# Patient Record
Sex: FEMALE | Race: OTHER | HISPANIC OR LATINO | URBAN - METROPOLITAN AREA
[De-identification: names, ages, dates, MRNs, and addresses within clinical notes are randomized per-mention and may not be internally consistent; named-entity substitution may affect disease eponyms.]

---

## 2023-08-30 ENCOUNTER — EMERGENCY (EMERGENCY)
Facility: HOSPITAL | Age: 28
LOS: 1 days | Discharge: HOME | End: 2023-08-30
Attending: STUDENT IN AN ORGANIZED HEALTH CARE EDUCATION/TRAINING PROGRAM
Payer: MEDICAID

## 2023-08-30 VITALS
TEMPERATURE: 98 F | WEIGHT: 177.03 LBS | HEART RATE: 103 BPM | DIASTOLIC BLOOD PRESSURE: 81 MMHG | HEIGHT: 68.11 IN | SYSTOLIC BLOOD PRESSURE: 117 MMHG | RESPIRATION RATE: 20 BRPM | OXYGEN SATURATION: 99 %

## 2023-08-30 VITALS
OXYGEN SATURATION: 100 % | SYSTOLIC BLOOD PRESSURE: 121 MMHG | TEMPERATURE: 99 F | HEART RATE: 82 BPM | DIASTOLIC BLOOD PRESSURE: 76 MMHG | RESPIRATION RATE: 19 BRPM

## 2023-08-30 PROCEDURE — 93971 EXTREMITY STUDY: CPT | Mod: 26,LT

## 2023-08-30 PROCEDURE — 93971 EXTREMITY STUDY: CPT

## 2023-08-30 PROCEDURE — 99284 EMERGENCY DEPT VISIT MOD MDM: CPT | Mod: 25

## 2023-08-30 PROCEDURE — 99284 EMERGENCY DEPT VISIT MOD MDM: CPT

## 2023-08-30 RX ORDER — DIPHENHYDRAMINE HCL 50 MG
25 CAPSULE ORAL ONCE
Refills: 0 | Status: COMPLETED | OUTPATIENT
Start: 2023-08-30 | End: 2023-08-30

## 2023-08-30 RX ADMIN — Medication 25 MILLIGRAM(S): at 17:30

## 2023-08-30 NOTE — ED PROVIDER NOTE - NSFOLLOWUPCLINICS_GEN_ALL_ED_FT
[facility] Allergy and Immunology  Allergy  [address] [place] [address]  Phone: [phone]  Fax:

## 2023-08-30 NOTE — ED PROVIDER NOTE - NSFOLLOWUPINSTRUCTIONS_ED_ALL_ED_FT
no concerns You were seen today for 2 complaints.      Please follow-up with an allergist for your rash.    Please follow-up with neurology and podiatrist for leg pain and back pain.    Please establish care with a primary care physician for continued care. You were seen today for 2 complaints.  See that attached list for phone numbers to call to make an appointment.     Please follow-up with an allergist for your rash.    Please follow-up with neurology and podiatrist for leg pain and back pain.    Please establish care with a primary care physician for continued care.    You may take over the counter benadryl for itchiness - this medication can make you drowsy please take it with caution.     You can use 500-1000mg Tylenol every 6 hours for pain - as needed.  This is an over-the-counter medications - please respect the warnings on the label. This medication come with certain risks and side effects that you need to discuss with your doctor, especially if you are taking it for a prolonged period.    If you start developing change in strength or sensation your extremities, fever, chills, chest pain, shortness of breath or any other concerning symptoms please seek medical assistance.

## 2023-08-30 NOTE — ED PROVIDER NOTE - PATIENT PORTAL LINK FT
You can access the FollowMyHealth Patient Portal offered by [facility] by registering at the following website: [email or web address]. By joining [employer]’s FollowMyHealth portal, you will also be able to view your health information using other applications (apps) compatible with our system.

## 2023-08-30 NOTE — ED PROVIDER NOTE - CLINICAL SUMMARY MEDICAL DECISION MAKING FREE TEXT BOX
28-year-old female Thai-speaking deferred  services, had family by bedside who translated for the patient.  No past medical history.  Presents with pain in her left calf over past few days.  Patient states few years ago she had breast augmentation. She is unsure if the back pain is related but started having back pain since then. 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 Since the nerve damage she gets intermittent back pain and pain radiating down her LT leg. States LT great toe nail falls off every 3-4 months. But now the nail is turning black which concerned her. Able to ambulate normally. Did not take anything for pain. Also endorses she has been noting hives for past 2 weeks thats itching. Rash is migratory. Took loratadine without relief.  Chest pain, shortness of breath, fevers, chills, abdominal pain, nausea, vomiting.  Does not have pain in her back at this time.  No history of blood clots in the past.  Patient is well-appearing.  No distress.  Equal strength and sensation in all extremities.  Smooth gait.  Positive left calf tenderness to palpation.  No focal tenderness of the left lower extremity noted.  Equal pedal pulses appreciated.  Equal sensation in lower extremities.  Her feet are warm to touch bilaterally.  Noted left great toe is discolored.  Noted rash consistent with hives of upper extremities.  Differential diagnoses include but not limited to hives of unknown etiology, DVT.  No concern for arterial occlusion given pulses are intact and feet are warm to touch.  Concern for sciatica.    Plan to have the patient follow-up with PCP, podiatry, neurologist, allergist–patient is provided a list with all the physicians.